# Patient Record
Sex: MALE | Race: BLACK OR AFRICAN AMERICAN | HISPANIC OR LATINO | Employment: STUDENT | URBAN - METROPOLITAN AREA
[De-identification: names, ages, dates, MRNs, and addresses within clinical notes are randomized per-mention and may not be internally consistent; named-entity substitution may affect disease eponyms.]

---

## 2021-10-30 DIAGNOSIS — M25.572 ACUTE LEFT ANKLE PAIN: Primary | ICD-10-CM

## 2021-10-30 PROBLEM — S99.912A ANKLE INJURY, LEFT, INITIAL ENCOUNTER: Status: ACTIVE | Noted: 2021-10-30

## 2021-10-30 PROCEDURE — 99202 OFFICE O/P NEW SF 15 MIN: CPT | Performed by: FAMILY MEDICINE

## 2021-11-03 ENCOUNTER — APPOINTMENT (OUTPATIENT)
Dept: RADIOLOGY | Facility: CLINIC | Age: 21
End: 2021-11-03
Payer: COMMERCIAL

## 2021-11-03 DIAGNOSIS — M25.572 ACUTE LEFT ANKLE PAIN: ICD-10-CM

## 2021-11-03 PROCEDURE — 73610 X-RAY EXAM OF ANKLE: CPT

## 2022-04-26 ENCOUNTER — ATHLETIC TRAINING (OUTPATIENT)
Dept: SPORTS MEDICINE | Facility: OTHER | Age: 22
End: 2022-04-26

## 2022-04-26 DIAGNOSIS — S96.912A MUSCLE STRAIN OF LEFT FOOT, INITIAL ENCOUNTER: Primary | ICD-10-CM

## 2022-04-27 NOTE — PROGRESS NOTES
Athletic Training Progress Note    Name: Robert Cardoso  Age: 22 y o  Assessment/Plan:     Visit Diagnosis: Muscle strain of left foot, initial encounter [[id number]]    Treatment Plan: Decrease pain    []  Follow-up PRN  []  Follow-up prior to next practice/game for re-evaluation  [x]  Daily treatment/rehab  Progress note expected weekly  Subjective: Pt has pain with walking but not with running or play football  Pain 5/10 with walking to 3/10  Objective:  Inflammation  See treatment log below    Treatment Log:     Date: 4/27/22       Playing Status: Full Go               Exercise/Treatment        Full AROM Hip/ Knee/ Foot 2x12       Posterior Tib release x3